# Patient Record
Sex: MALE | Employment: UNEMPLOYED | URBAN - METROPOLITAN AREA
[De-identification: names, ages, dates, MRNs, and addresses within clinical notes are randomized per-mention and may not be internally consistent; named-entity substitution may affect disease eponyms.]

---
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
## 2023-02-14 ENCOUNTER — OFFICE VISIT (OUTPATIENT)
Dept: OPHTHALMOLOGY | Facility: CLINIC | Age: 18
End: 2023-02-14
Attending: OPHTHALMOLOGY
Payer: COMMERCIAL

## 2023-02-14 DIAGNOSIS — H50.21 HYPERTROPIA OF RIGHT EYE: ICD-10-CM

## 2023-02-14 DIAGNOSIS — H47.033 OPTIC NERVE HYPOPLASIA OF BOTH EYES: Primary | ICD-10-CM

## 2023-02-14 DIAGNOSIS — H55.01 CONGENITAL NYSTAGMUS: ICD-10-CM

## 2023-02-14 DIAGNOSIS — H52.13 MYOPIA OF BOTH EYES: ICD-10-CM

## 2023-02-14 PROCEDURE — 92060 SENSORIMOTOR EXAMINATION: CPT | Performed by: OPHTHALMOLOGY

## 2023-02-14 PROCEDURE — 92081 LIMITED VISUAL FIELD XM: CPT | Performed by: OPHTHALMOLOGY

## 2023-02-14 PROCEDURE — 92014 COMPRE OPH EXAM EST PT 1/>: CPT | Mod: GC | Performed by: OPHTHALMOLOGY

## 2023-02-14 PROCEDURE — G0463 HOSPITAL OUTPT CLINIC VISIT: HCPCS | Mod: 25

## 2023-02-14 PROCEDURE — 92081 LIMITED VISUAL FIELD XM: CPT | Mod: 26 | Performed by: OPHTHALMOLOGY

## 2023-02-14 ASSESSMENT — CONF VISUAL FIELD
METHOD: COUNTING FINGERS
OS_INFERIOR_TEMPORAL_RESTRICTION: 0
OD_SUPERIOR_TEMPORAL_RESTRICTION: 0
OS_SUPERIOR_NASAL_RESTRICTION: 0
OS_NORMAL: 1
OD_NORMAL: 1
OS_SUPERIOR_TEMPORAL_RESTRICTION: 0
OD_INFERIOR_NASAL_RESTRICTION: 0
OD_SUPERIOR_NASAL_RESTRICTION: 0
OS_INFERIOR_NASAL_RESTRICTION: 0
OD_INFERIOR_TEMPORAL_RESTRICTION: 0

## 2023-02-14 ASSESSMENT — REFRACTION_MANIFEST
OD_SPHERE: -6.00
OS_SPHERE: -6.25
OS_CYLINDER: +3.50
OS_AXIS: 090
OD_AXIS: 090
OD_CYLINDER: +3.50

## 2023-02-14 ASSESSMENT — REFRACTION
OD_CYLINDER: +2.50
OS_SPHERE: -5.00
OD_AXIS: 100
OD_AXIS: 090
OD_SPHERE: -6.00
OS_SPHERE: -5.50
OS_AXIS: 090
OS_AXIS: 085
OD_CYLINDER: +4.00
OS_CYLINDER: +2.25
OD_SPHERE: -5.50
OS_CYLINDER: +4.00

## 2023-02-14 ASSESSMENT — VISUAL ACUITY
METHOD: SNELLEN - LINEAR
OD_CC: 20/70
CORRECTION_TYPE: GLASSES
OS_CC: 20/60
OS_CC+: +2

## 2023-02-14 ASSESSMENT — TONOMETRY
IOP_METHOD: ICARE
OD_IOP_MMHG: 16
OS_IOP_MMHG: 14

## 2023-02-14 ASSESSMENT — REFRACTION_WEARINGRX
OD_SPHERE: -5.25
OD_CYLINDER: +3.50
SPECS_TYPE: SVL
OS_AXIS: 095
OD_AXIS: 093
OS_SPHERE: -5.25
OS_CYLINDER: +3.00

## 2023-02-14 ASSESSMENT — CUP TO DISC RATIO: OS_RATIO: 0.45

## 2023-02-14 NOTE — PROGRESS NOTES
"Chief Complaint(s) and History of Present Illness(es)     Nystagmus Evaluation            Laterality: both eyes    Course: stable    Associated symptoms: Negative for eye pain          Comments    Stable vision, denies peripheral vision constriction, denies changes in color vision  FTGW  Would like DMV forms filled  Does not drive yet           History was obtained from the following independent historians: patient and mother.    Review of systems for the eyes was negative other than the pertinent positives and negatives noted in the HPI.  History is obtained from the patient and father.   Primary care: Anton Castillo   Jeremy Mirza is a 17 year old male who presents with:       ICD-10-CM    1. Optic nerve hypoplasia of both eyes  H47.033 [place] DMV      2. Intermittent exotropia, alternating  H50.34       3. Myopia of both eyes  H52.13       4. Congenital nystagmus  H55.01 [place] DMV      5. 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  Ok to drive with restrictions; patient will send the DMV form via [employer] message to Alli Armas   F/u 1-2 years.    Elissa Esqueda MD     Further details of the management plan can be found in the \"Patient Instructions\" section which was printed and given to the patient at checkout.  No follow-ups on file.     Attending Physician Attestation:  Complete documentation of historical and exam elements from today's encounter can be found in the full encounter summary report (not reduplicated in this progress note).  I personally obtained the chief complaint(s) and history of present illness.  I confirmed and edited as necessary the review of systems, past medical/surgical history, family history, social history, and examination findings as documented " by others; and I examined the patient myself.  I personally reviewed the relevant tests, images, and reports as documented above.  I formulated and edited as necessary the assessment and plan and discussed the findings and management plan with the patient and family. - Elissa Esqueda MD February 14, 2023

## 2023-02-14 NOTE — NURSING NOTE
Chief Complaint(s) and History of Present Illness(es)     Nystagmus Evaluation            Laterality: both eyes    Course: stable    Associated symptoms: Negative for eye pain          Comments    Stable  FTGW  Would like DMV forms filled\  Does not drive yet

## 2023-03-01 ASSESSMENT — EXTERNAL EXAM - LEFT EYE: OS_EXAM: NORMAL

## 2023-03-01 ASSESSMENT — SLIT LAMP EXAM - LIDS
COMMENTS: NORMAL
COMMENTS: NORMAL

## 2023-03-01 ASSESSMENT — CUP TO DISC RATIO: OD_RATIO: 0.3

## 2023-03-01 ASSESSMENT — EXTERNAL EXAM - RIGHT EYE: OD_EXAM: NORMAL

## 2024-06-04 ENCOUNTER — TELEPHONE (OUTPATIENT)
Dept: OPHTHALMOLOGY | Facility: CLINIC | Age: 19
End: 2024-06-04
Payer: COMMERCIAL

## 2024-06-04 NOTE — TELEPHONE ENCOUNTER
"Last instructions from Dr. Esqueda at exam on 2/14/23 state \"F/u 1-2 years.\" Patient was 17 years old at that time so patient is okay to still be seen by Dr. Esqueda.     Melanie Jeans, Ophthalmic Assistant    "

## 2024-06-04 NOTE — TELEPHONE ENCOUNTER
[facility] Call Center    Phone Message    May a detailed message be left on voicemail: yes     Reason for Call: Other: patient is scheduled on 10/24/24 with Dheeraj. Patient was scheduled on 2/15/24 but no showed. No notes in chart if patient should be continued seen in the [facility], or if they are supposed to be seen in the Adult Eye clinic. Please review appointment . Thanks.     Action Taken: Other: peds eye     Travel Screening: Not Applicable     Date of Service:

## 2024-12-08 ENCOUNTER — HOSPITAL ENCOUNTER (EMERGENCY)
Facility: CLINIC | Age: 19
Discharge: HOME | End: 2024-12-08
Attending: EMERGENCY MEDICINE | Admitting: EMERGENCY MEDICINE
Payer: COMMERCIAL

## 2024-12-08 VITALS
WEIGHT: 258.82 LBS | TEMPERATURE: 98.6 F | HEIGHT: 75 IN | BODY MASS INDEX: 32.18 KG/M2 | HEART RATE: 117 BPM | OXYGEN SATURATION: 98 % | RESPIRATION RATE: 20 BRPM | SYSTOLIC BLOOD PRESSURE: 112 MMHG | DIASTOLIC BLOOD PRESSURE: 93 MMHG

## 2024-12-08 DIAGNOSIS — R10.13 EPIGASTRIC PAIN: ICD-10-CM

## 2024-12-08 DIAGNOSIS — R11.2 NAUSEA, VOMITING AND DIARRHEA: ICD-10-CM

## 2024-12-08 DIAGNOSIS — R19.7 NAUSEA, VOMITING AND DIARRHEA: ICD-10-CM

## 2024-12-08 LAB
ALBUMIN SERPL BCG-MCNC: 4.4 G/DL (ref 3.5–5.2)
ALP SERPL-CCNC: 78 U/L (ref 40–150)
ALT SERPL W P-5'-P-CCNC: 98 U/L (ref 0–70)
ANION GAP SERPL CALCULATED.3IONS-SCNC: 16 MMOL/L (ref 7–15)
AST SERPL W P-5'-P-CCNC: 44 U/L (ref 0–45)
BASOPHILS # BLD AUTO: 0 10E3/UL (ref 0–0.2)
BASOPHILS NFR BLD AUTO: 0 %
BILIRUB SERPL-MCNC: 2.2 MG/DL
BUN SERPL-MCNC: 16.2 MG/DL (ref 6–20)
CALCIUM SERPL-MCNC: 8.3 MG/DL (ref 8.8–10.4)
CHLORIDE SERPL-SCNC: 98 MMOL/L (ref 98–107)
CREAT SERPL-MCNC: 1.01 MG/DL (ref 0.67–1.17)
EGFRCR SERPLBLD CKD-EPI 2021: >90 ML/MIN/1.73M2
EOSINOPHIL # BLD AUTO: 0 10E3/UL (ref 0–0.7)
EOSINOPHIL NFR BLD AUTO: 0 %
ERYTHROCYTE [DISTWIDTH] IN BLOOD BY AUTOMATED COUNT: 13.3 % (ref 10–15)
GLUCOSE SERPL-MCNC: 104 MG/DL (ref 70–99)
HCO3 SERPL-SCNC: 22 MMOL/L (ref 22–29)
HCT VFR BLD AUTO: 44.3 % (ref 40–53)
HGB BLD-MCNC: 15.3 G/DL (ref 13.3–17.7)
HOLD SPECIMEN: NORMAL
HOLD SPECIMEN: NORMAL
IMM GRANULOCYTES # BLD: 0.1 10E3/UL
IMM GRANULOCYTES NFR BLD: 1 %
LIPASE SERPL-CCNC: 10 U/L (ref 13–60)
LYMPHOCYTES # BLD AUTO: 1 10E3/UL (ref 0.8–5.3)
LYMPHOCYTES NFR BLD AUTO: 8 %
MCH RBC QN AUTO: 29.9 PG (ref 26.5–33)
MCHC RBC AUTO-ENTMCNC: 34.5 G/DL (ref 31.5–36.5)
MCV RBC AUTO: 87 FL (ref 78–100)
MONOCYTES # BLD AUTO: 0.8 10E3/UL (ref 0–1.3)
MONOCYTES NFR BLD AUTO: 7 %
NEUTROPHILS # BLD AUTO: 10.9 10E3/UL (ref 1.6–8.3)
NEUTROPHILS NFR BLD AUTO: 85 %
NRBC # BLD AUTO: 0 10E3/UL
NRBC BLD AUTO-RTO: 0 /100
PLATELET # BLD AUTO: 149 10E3/UL (ref 150–450)
POTASSIUM SERPL-SCNC: 3.6 MMOL/L (ref 3.4–5.3)
PROT SERPL-MCNC: 7.5 G/DL (ref 6.4–8.3)
RBC # BLD AUTO: 5.12 10E6/UL (ref 4.4–5.9)
SODIUM SERPL-SCNC: 136 MMOL/L (ref 135–145)
WBC # BLD AUTO: 12.9 10E3/UL (ref 4–11)

## 2024-12-08 PROCEDURE — 83690 ASSAY OF LIPASE: CPT | Performed by: EMERGENCY MEDICINE

## 2024-12-08 PROCEDURE — 85004 AUTOMATED DIFF WBC COUNT: CPT | Performed by: EMERGENCY MEDICINE

## 2024-12-08 PROCEDURE — 99284 EMERGENCY DEPT VISIT MOD MDM: CPT | Mod: 25

## 2024-12-08 PROCEDURE — 96375 TX/PRO/DX INJ NEW DRUG ADDON: CPT

## 2024-12-08 PROCEDURE — 82040 ASSAY OF SERUM ALBUMIN: CPT | Performed by: EMERGENCY MEDICINE

## 2024-12-08 PROCEDURE — 82310 ASSAY OF CALCIUM: CPT | Performed by: EMERGENCY MEDICINE

## 2024-12-08 PROCEDURE — 250N000011 HC RX IP 250 OP 636: Performed by: EMERGENCY MEDICINE

## 2024-12-08 PROCEDURE — 36415 COLL VENOUS BLD VENIPUNCTURE: CPT | Performed by: EMERGENCY MEDICINE

## 2024-12-08 PROCEDURE — 258N000003 HC RX IP 258 OP 636: Performed by: EMERGENCY MEDICINE

## 2024-12-08 PROCEDURE — 96374 THER/PROPH/DIAG INJ IV PUSH: CPT

## 2024-12-08 PROCEDURE — 96361 HYDRATE IV INFUSION ADD-ON: CPT

## 2024-12-08 PROCEDURE — 93005 ELECTROCARDIOGRAM TRACING: CPT

## 2024-12-08 RX ORDER — METOCLOPRAMIDE HYDROCHLORIDE 5 MG/ML
10 INJECTION INTRAMUSCULAR; INTRAVENOUS ONCE
Status: COMPLETED | OUTPATIENT
Start: 2024-12-08 | End: 2024-12-08

## 2024-12-08 RX ORDER — DIPHENHYDRAMINE HYDROCHLORIDE 50 MG/ML
25 INJECTION INTRAMUSCULAR; INTRAVENOUS ONCE
Status: COMPLETED | OUTPATIENT
Start: 2024-12-08 | End: 2024-12-08

## 2024-12-08 RX ORDER — ONDANSETRON 4 MG/1
4 TABLET, ORALLY DISINTEGRATING ORAL EVERY 8 HOURS PRN
Qty: 10 TABLET | Refills: 0 | Status: SHIPPED | OUTPATIENT
Start: 2024-12-08 | End: 2024-12-11

## 2024-12-08 RX ADMIN — DIPHENHYDRAMINE HYDROCHLORIDE 25 MG: 50 INJECTION, SOLUTION INTRAMUSCULAR; INTRAVENOUS at 22:50

## 2024-12-08 RX ADMIN — METOCLOPRAMIDE 10 MG: 5 INJECTION, SOLUTION INTRAMUSCULAR; INTRAVENOUS at 22:49

## 2024-12-08 RX ADMIN — SODIUM CHLORIDE 1000 ML: 9 INJECTION, SOLUTION INTRAVENOUS at 22:48

## 2024-12-08 ASSESSMENT — COLUMBIA-SUICIDE SEVERITY RATING SCALE - C-SSRS
2. HAVE YOU ACTUALLY HAD ANY THOUGHTS OF KILLING YOURSELF IN THE PAST MONTH?: NO
1. IN THE PAST MONTH, HAVE YOU WISHED YOU WERE DEAD OR WISHED YOU COULD GO TO SLEEP AND NOT WAKE UP?: NO
6. HAVE YOU EVER DONE ANYTHING, STARTED TO DO ANYTHING, OR PREPARED TO DO ANYTHING TO END YOUR LIFE?: NO

## 2024-12-08 ASSESSMENT — ACTIVITIES OF DAILY LIVING (ADL): ADLS_ACUITY_SCORE: 41

## 2024-12-08 NOTE — Clinical Note
Jeremy Mirza was seen and treated in our emergency department on 12/8/2024.  He may return to work on 12/10/2024.       If you have any questions or concerns, please don't hesitate to call.      Stewart Ramos MD

## 2024-12-09 LAB
ATRIAL RATE - MUSE: 107 BPM
DIASTOLIC BLOOD PRESSURE - MUSE: NORMAL MMHG
INTERPRETATION ECG - MUSE: NORMAL
P AXIS - MUSE: 10 DEGREES
PR INTERVAL - MUSE: 166 MS
QRS DURATION - MUSE: 102 MS
QT - MUSE: 330 MS
QTC - MUSE: 440 MS
R AXIS - MUSE: -31 DEGREES
SYSTOLIC BLOOD PRESSURE - MUSE: NORMAL MMHG
T AXIS - MUSE: 29 DEGREES
VENTRICULAR RATE- MUSE: 107 BPM

## 2024-12-09 NOTE — DISCHARGE INSTRUCTIONS
It was a pleasure taking care of you today. I hope you feel much better soon.  Your evaluation was reassuring.  Please follow-up with your primary care doctor in 3-5 days as needed.  Return immediately for worse pain, fever, or any other concerns.

## 2024-12-09 NOTE — ED TRIAGE NOTES
Pt arrives to ED after 24 hrs of vomiting and diarrhea. Pt reports it began after having [employer] on Saturday and it began shortly after that. Pt reports that last couple bouts of vomiting had some bright red blood in. Pt reports abdominal pain that begins randomly, 7/10 pain. Pt reports taking bismuth subsalicylate around 1700.

## 2024-12-09 NOTE — ED PROVIDER NOTES
"    History     Chief Complaint:  Vomiting and diarrhea       HPI   Jeremy Mirza is a 19 year old male who presents with vomiting, diarrhea, and abdominal pain.  As the patient is acutely nauseated, and his father supplements the history.  Together, they note that he began to have malaise and a sense of feeling ill Friday night into Saturday morning.  His father picked him up [employer] for lunch on Saturday, and thereafter he threw up.  He then ate canes chicken later in the day, and had vomiting and diarrhea.  He has had intermittent symptoms as well as abdominal cramping to the epigastrium overnight and into today.  He denies any fevers, chills, back pain, or any other concerns.        Independent Historian:   Father supplements the above history    Review of External Notes:   Reviewed 2/14/2023 office visit    ROS:  Review of Systems    Allergies:  No Known Allergies     Medications:    Carboxymethylcellul-Glycerin 1-0.9 % GEL  olopatadine (PATANOL) 0.1 % ophthalmic solution          Past History:    Past Medical History:   Diagnosis Date    Nystagmus     Strabismus          Physical Exam     Patient Vitals for the past 24 hrs:  Vitals:    12/08/24 2128   BP: (!) 112/93   Pulse: 117   Resp: 20   Temp: 98.6  F (37  C)   TempSrc: Oral   SpO2: 98%   Weight: 117.4 kg (258 lb 13.1 oz)   Height: 1.905 m (6' 3\")         Physical Exam  Constitutional: Alert, attentive  HENT:    Nose: Nose normal.    Mouth/Throat: Oropharynx is clear, mucous membranes are moist   Eyes: EOM are normal.   CV: Patient is not tachycardic, exam.  Regular rate and rhythm; no murmurs, rubs or gallups  Chest: Effort normal and breath sounds normal.   GI:  There is no tenderness. No distension. Normal bowel sounds  MSK: Normal range of motion.   Neurological: Alert, attentive  Skin: Skin is warm and dry.      Emergency Department Course       Results for orders placed or performed during the hospital encounter of 12/08/24   Comprehensive " metabolic panel     Status: Abnormal   Result Value Ref Range    Sodium 136 135 - 145 mmol/L    Potassium 3.6 3.4 - 5.3 mmol/L    Carbon Dioxide (CO2) 22 22 - 29 mmol/L    Anion Gap 16 (H) 7 - 15 mmol/L    Urea Nitrogen 16.2 6.0 - 20.0 mg/dL    Creatinine 1.01 0.67 - 1.17 mg/dL    GFR Estimate >90 >60 mL/min/1.73m2    Calcium 8.3 (L) 8.8 - 10.4 mg/dL    Chloride 98 98 - 107 mmol/L    Glucose 104 (H) 70 - 99 mg/dL    Alkaline Phosphatase 78 40 - 150 U/L    AST 44 0 - 45 U/L    ALT 98 (H) 0 - 70 U/L    Protein Total 7.5 6.4 - 8.3 g/dL    Albumin 4.4 3.5 - 5.2 g/dL    Bilirubin Total 2.2 (H) <=1.2 mg/dL   [place] Draw     Status: None    Narrative    The following orders were created for panel order [place] Draw.  Procedure                               Abnormality         Status                     ---------                               -----------         ------                     Extra Blue Top Tube[[id number]]                              Final result               Extra Red Top Tube[[id number]]                               Final result                 Please view results for these tests on the individual orders.   CBC with platelets and differential     Status: Abnormal   Result Value Ref Range    WBC Count 12.9 (H) 4.0 - 11.0 10e3/uL    RBC Count 5.12 4.40 - 5.90 10e6/uL    Hemoglobin 15.3 13.3 - 17.7 g/dL    Hematocrit 44.3 40.0 - 53.0 %    MCV 87 78 - 100 fL    MCH 29.9 26.5 - 33.0 pg    MCHC 34.5 31.5 - 36.5 g/dL    RDW 13.3 10.0 - 15.0 %    Platelet Count 149 (L) 150 - 450 10e3/uL    % Neutrophils 85 %    % Lymphocytes 8 %    % Monocytes 7 %    % Eosinophils 0 %    % Basophils 0 %    % Immature Granulocytes 1 %    NRBCs per 100 WBC 0 <1 /100    Absolute Neutrophils 10.9 (H) 1.6 - 8.3 10e3/uL    Absolute Lymphocytes 1.0 0.8 - 5.3 10e3/uL    Absolute Monocytes 0.8 0.0 - 1.3 10e3/uL    Absolute Eosinophils 0.0 0.0 - 0.7 10e3/uL    Absolute Basophils 0.0 0.0 - 0.2 10e3/uL    Absolute Immature Granulocytes 0.1  <=0.4 10e3/uL    Absolute NRBCs 0.0 10e3/uL   Extra Blue Top Tube     Status: None   Result Value Ref Range    Hold Specimen JIC    Extra Red Top Tube     Status: None   Result Value Ref Range    Hold Specimen JIC    Lipase     Status: Abnormal   Result Value Ref Range    Lipase 10 (L) 13 - 60 U/L   EKG 12 lead     Status: None (Preliminary result)   Result Value Ref Range    Systolic Blood Pressure  mmHg    Diastolic Blood Pressure  mmHg    Ventricular Rate 107 BPM    Atrial Rate 107 BPM    NJ Interval 166 ms    QRS Duration 102 ms     ms    QTc 440 ms    P Axis 10 degrees    R AXIS -31 degrees    T Axis 29 degrees    Interpretation ECG       Sinus tachycardia  Left axis deviation  Abnormal ECG  No previous ECGs available     CBC with Platelets & Differential     Status: Abnormal    Narrative    The following orders were created for panel order CBC with Platelets & Differential.  Procedure                               Abnormality         Status                     ---------                               -----------         ------                     CBC with platelets and d...[[id number]]  Abnormal            Final result                 Please view results for these tests on the individual orders.       Emergency Department Course & Assessments:             Interventions:  Medications   diphenhydrAMINE (BENADRYL) injection 25 mg (25 mg Intravenous $Given 12/8/24 2250)   metoclopramide (REGLAN) injection 10 mg (10 mg Intravenous $Given 12/8/24 2249)   sodium chloride 0.9% BOLUS 1,000 mL (0 mLs Intravenous Stopped 12/8/24 2343)       Disposition:  The patient was discharged to home.     Impression & Plan        Medical Decision Making:  This is a 19-year-old male who presents for evaluation of abdominal pain, vomiting, and diarrhea.  Given the slow evolution of symptoms over 2 days, suspect viral process.  He has no lower quadrant tenderness and is well-hydrated, well-appearing.  No shigellosis or hematemesis.   Screening labs are generally reassuring.  Symptoms are improved and he is tolerating p.o.'s without difficulty on recheck.  Recommended gradual advancement of diet versus any further fast food or other heavy foods.  Primary care follow-up in 2 to 3 days and return precautions for pain, intractable vomiting, or any other concerns.          Diagnosis:  Visit Diagnosis, Associated Orders, and Comments     ICD-10-CM    1. Nausea, vomiting and diarrhea  R11.2     R19.7       2. Epigastric pain  R10.13              Stewart Ramos MD  12/09/24 0149